# Patient Record
Sex: FEMALE | Race: ASIAN | NOT HISPANIC OR LATINO
[De-identification: names, ages, dates, MRNs, and addresses within clinical notes are randomized per-mention and may not be internally consistent; named-entity substitution may affect disease eponyms.]

---

## 2017-03-30 PROBLEM — Z00.00 ENCOUNTER FOR PREVENTIVE HEALTH EXAMINATION: Status: ACTIVE | Noted: 2017-03-30

## 2017-04-26 ENCOUNTER — APPOINTMENT (OUTPATIENT)
Dept: GASTROENTEROLOGY | Facility: CLINIC | Age: 62
End: 2017-04-26

## 2017-04-26 DIAGNOSIS — Z82.3 FAMILY HISTORY OF STROKE: ICD-10-CM

## 2017-04-26 DIAGNOSIS — E78.00 PURE HYPERCHOLESTEROLEMIA, UNSPECIFIED: ICD-10-CM

## 2017-04-26 DIAGNOSIS — Z83.3 FAMILY HISTORY OF DIABETES MELLITUS: ICD-10-CM

## 2017-04-26 DIAGNOSIS — Z78.9 OTHER SPECIFIED HEALTH STATUS: ICD-10-CM

## 2017-04-26 DIAGNOSIS — Z86.39 PERSONAL HISTORY OF OTHER ENDOCRINE, NUTRITIONAL AND METABOLIC DISEASE: ICD-10-CM

## 2017-04-26 DIAGNOSIS — F15.90 OTHER STIMULANT USE, UNSPECIFIED, UNCOMPLICATED: ICD-10-CM

## 2017-04-26 DIAGNOSIS — Z12.11 ENCOUNTER FOR SCREENING FOR MALIGNANT NEOPLASM OF COLON: ICD-10-CM

## 2017-04-26 RX ORDER — METOPROLOL TARTRATE 25 MG/1
25 TABLET, FILM COATED ORAL
Refills: 0 | Status: ACTIVE | COMMUNITY

## 2017-04-26 RX ORDER — LISINOPRIL 20 MG/1
20 TABLET ORAL
Refills: 0 | Status: ACTIVE | COMMUNITY

## 2017-04-26 RX ORDER — INSULIN GLARGINE 100 [IU]/ML
100 INJECTION, SOLUTION SUBCUTANEOUS
Refills: 0 | Status: ACTIVE | COMMUNITY

## 2017-04-26 RX ORDER — GLIPIZIDE 10 MG/1
10 TABLET ORAL
Refills: 0 | Status: ACTIVE | COMMUNITY

## 2017-04-26 RX ORDER — SIMVASTATIN 40 MG/1
40 TABLET, FILM COATED ORAL
Refills: 0 | Status: ACTIVE | COMMUNITY

## 2017-04-26 RX ORDER — ASPIRIN 81 MG
81 TABLET, DELAYED RELEASE (ENTERIC COATED) ORAL
Refills: 0 | Status: ACTIVE | COMMUNITY

## 2018-07-27 PROBLEM — Z78.9 ALCOHOL USE: Status: INACTIVE | Noted: 2017-04-26

## 2018-12-27 VITALS
DIASTOLIC BLOOD PRESSURE: 81 MMHG | HEART RATE: 72 BPM | TEMPERATURE: 98 F | HEIGHT: 64 IN | SYSTOLIC BLOOD PRESSURE: 184 MMHG | RESPIRATION RATE: 18 BRPM | WEIGHT: 175.05 LBS | OXYGEN SATURATION: 100 %

## 2018-12-27 RX ORDER — CHLORHEXIDINE GLUCONATE 213 G/1000ML
1 SOLUTION TOPICAL ONCE
Qty: 0 | Refills: 0 | Status: DISCONTINUED | OUTPATIENT
Start: 2018-12-31 | End: 2018-12-31

## 2018-12-27 NOTE — H&P ADULT - HISTORY OF PRESENT ILLNESS
SKELETON  *Confirm Medications on Arrival    64 y/o F with PMHx of…  who presented to their cardiologist c/o CP and SOB...  Pt denies … dizziness, diaphoresis, palpitations, fatigue, LE edema, orthopnea, PND, syncope  Pt had a Myocardial Perfusion Scan (12/15/18): Inferior ischemia noted with normal EF. Upon resting study, no evidence of perfusion defect with wall motion abnormality noted. EF 48%. Stress Study, noted inferior wall perfusion defect with wall motion abnormality EF 54%.   Due to pts risk factors, CCS Angina Class _ Sx, abnormal Myocardial Perfusion Scan , pt is referred for cardiac catheterization w/ possible intervention. *Confirm Medications on Arrival  62 y/o F with PMHx HTN, HLD, DM, who presented to her cardiologist for routine follow-up. She reports that she experiences SOB after climbing her 2 flights of stairs if she is carrying something, grocery bags for example, relieved with rest. She denies CP, dizziness, diaphoresis, n/v, palpitations, fatigue, LE edema, orthopnea, PND, or syncope. She had a Myocardial Perfusion Scan (12/15/18): Inferior ischemia noted with normal EF. Upon resting study, no evidence of perfusion defect with wall motion abnormality noted. EF 48%. Stress Study noted inferior wall perfusion defect with wall motion abnormality EF 54%.   In light of patient's risk factors, CCS Angina Class II angina equivalent symptoms, and abnormal NST, the patient is recommended for cardiac catheterization w/ possible intervention if clinically indicated. 62 y/o F with FHx of CAD and PMHx HTN, HLD, DM, who presented to her cardiologist for routine follow-up. She reports that she experiences SOB after climbing her 2 flights of stairs if she is carrying something, grocery bags for example, relieved with rest. She denies CP, dizziness, diaphoresis, n/v, palpitations, fatigue, LE edema, orthopnea, PND, or syncope. She had a Myocardial Perfusion Scan (12/15/18): Inferior ischemia noted with normal EF. Upon resting study, no evidence of perfusion defect with wall motion abnormality noted. EF 48%. Stress Study noted inferior wall perfusion defect with wall motion abnormality EF 54%.   In light of patient's risk factors, CCS Angina Class II angina equivalent symptoms, and abnormal NST, the patient is recommended for cardiac catheterization w/ possible intervention if clinically indicated.

## 2018-12-27 NOTE — H&P ADULT - SCARS
location/well healed, hyperpigmented 1 inch scar on right groin 2/2 minor trauma (accidental scratch)

## 2018-12-27 NOTE — H&P ADULT - RS GEN PE MLT RESP DETAILS PC
good air movement/no rhonchi/no wheezes/clear to auscultation bilaterally/no rales/respirations non-labored

## 2018-12-27 NOTE — H&P ADULT - NSHPLABSRESULTS_GEN_ALL_CORE
13.7   7.0   )-----------( 270      ( 31 Dec 2018 08:19 )             41.0   12-31    141  |  102  |  12  ----------------------------<  182<H>  4.7   |  26  |  0.59    Ca    9.6      31 Dec 2018 08:19    TPro  7.9  /  Alb  4.9  /  TBili  0.8  /  DBili  x   /  AST  20  /  ALT  23  /  AlkPhos  40  12-31  PT/INR - ( 31 Dec 2018 08:19 )   PT: 11.3 sec;   INR: 1.00          PTT - ( 31 Dec 2018 08:19 )  PTT:35.1 sec

## 2018-12-27 NOTE — H&P ADULT - ASSESSMENT
64 y/o F with FHx of CAD and PMHx HTN, HLD, DM, who presents to [facility] for cardiac catheterization w/ possible intervention if clinically indicated in the setting of patient's risk factors, CCS Angina Class II angina equivalent symptoms, and abnormal NST.  ASA III   Mallampati IV    Risks & benefits of procedure and alternative therapy have been explained to the patient including but not limited to: allergic reaction, bleeding w/possible need for blood transfusion, infection, renal and vascular compromise, limb damage, arrhythmia, stroke, vessel dissection/perforation, Myocardial infarction, emergent CABG. Informed consent obtained and in chart.       Precath/  Consented  - Euvolemic on exam in the setting of LVEF of 48-54%. IVF: NS @ 75 cc/hr  As d/w Dr. Fallon  - Pt admitted with BP of 184/81 (HR 72) with endorsement of not taking her BP medications this AM. Pt is resting comfortably. Will monitor BP and HR and may give BP medications (Metoprolol Tartate 75mg BID and Lisinopril 20mg QD) post-procedure.  - Pt loaded with ASA 325mg PO x1 STAT and Plavix 300mg PO x1 STAT pre-procedure.

## 2018-12-31 ENCOUNTER — OUTPATIENT (OUTPATIENT)
Dept: OUTPATIENT SERVICES | Facility: HOSPITAL | Age: 63
LOS: 1 days | Discharge: HOME | End: 2018-12-31
Payer: MEDICAID

## 2018-12-31 LAB
ALBUMIN SERPL ELPH-MCNC: 4.9 G/DL — SIGNIFICANT CHANGE UP (ref 3.3–5)
ALP SERPL-CCNC: 40 U/L — SIGNIFICANT CHANGE UP (ref 40–120)
ALT FLD-CCNC: 23 U/L — SIGNIFICANT CHANGE UP (ref 10–45)
ANION GAP SERPL CALC-SCNC: 13 MMOL/L — SIGNIFICANT CHANGE UP (ref 5–17)
APTT BLD: 35.1 SEC — SIGNIFICANT CHANGE UP (ref 27.5–36.3)
AST SERPL-CCNC: 20 U/L — SIGNIFICANT CHANGE UP (ref 10–40)
BASOPHILS NFR BLD AUTO: 0.1 % — SIGNIFICANT CHANGE UP (ref 0–2)
BILIRUB SERPL-MCNC: 0.8 MG/DL — SIGNIFICANT CHANGE UP (ref 0.2–1.2)
BUN SERPL-MCNC: 12 MG/DL — SIGNIFICANT CHANGE UP (ref 7–23)
CALCIUM SERPL-MCNC: 9.6 MG/DL — SIGNIFICANT CHANGE UP (ref 8.4–10.5)
CHLORIDE SERPL-SCNC: 102 MMOL/L — SIGNIFICANT CHANGE UP (ref 96–108)
CHOLEST SERPL-MCNC: 149 MG/DL — SIGNIFICANT CHANGE UP (ref 10–199)
CK MB CFR SERPL CALC: 1.6 NG/ML — SIGNIFICANT CHANGE UP (ref 0–6.7)
CO2 SERPL-SCNC: 26 MMOL/L — SIGNIFICANT CHANGE UP (ref 22–31)
CREAT SERPL-MCNC: 0.59 MG/DL — SIGNIFICANT CHANGE UP (ref 0.5–1.3)
CRP SERPL-MCNC: 0.14 MG/DL — SIGNIFICANT CHANGE UP (ref 0–0.4)
EOSINOPHIL NFR BLD AUTO: 1.6 % — SIGNIFICANT CHANGE UP (ref 0–6)
GLUCOSE BLDC GLUCOMTR-MCNC: 156 MG/DL — HIGH (ref 70–99)
GLUCOSE BLDC GLUCOMTR-MCNC: 166 MG/DL — HIGH (ref 70–99)
GLUCOSE SERPL-MCNC: 182 MG/DL — HIGH (ref 70–99)
HBA1C BLD-MCNC: 7.1 % — HIGH (ref 4–5.6)
HCT VFR BLD CALC: 41 % — SIGNIFICANT CHANGE UP (ref 34.5–45)
HDLC SERPL-MCNC: 52 MG/DL — SIGNIFICANT CHANGE UP
HGB BLD-MCNC: 13.7 G/DL — SIGNIFICANT CHANGE UP (ref 11.5–15.5)
INR BLD: 1 — SIGNIFICANT CHANGE UP (ref 0.88–1.16)
LIPID PNL WITH DIRECT LDL SERPL: 80 MG/DL — SIGNIFICANT CHANGE UP
LYMPHOCYTES # BLD AUTO: 49.6 % — HIGH (ref 13–44)
MCHC RBC-ENTMCNC: 27.3 PG — SIGNIFICANT CHANGE UP (ref 27–34)
MCHC RBC-ENTMCNC: 33.4 G/DL — SIGNIFICANT CHANGE UP (ref 32–36)
MCV RBC AUTO: 81.7 FL — SIGNIFICANT CHANGE UP (ref 80–100)
MONOCYTES NFR BLD AUTO: 4.3 % — SIGNIFICANT CHANGE UP (ref 2–14)
NEUTROPHILS NFR BLD AUTO: 44.4 % — SIGNIFICANT CHANGE UP (ref 43–77)
PLATELET # BLD AUTO: 270 K/UL — SIGNIFICANT CHANGE UP (ref 150–400)
POTASSIUM SERPL-MCNC: 4.7 MMOL/L — SIGNIFICANT CHANGE UP (ref 3.5–5.3)
POTASSIUM SERPL-SCNC: 4.7 MMOL/L — SIGNIFICANT CHANGE UP (ref 3.5–5.3)
PROT SERPL-MCNC: 7.9 G/DL — SIGNIFICANT CHANGE UP (ref 6–8.3)
PROTHROM AB SERPL-ACNC: 11.3 SEC — SIGNIFICANT CHANGE UP (ref 10–12.9)
RBC # BLD: 5.02 M/UL — SIGNIFICANT CHANGE UP (ref 3.8–5.2)
RBC # FLD: 13.7 % — SIGNIFICANT CHANGE UP (ref 10.3–16.9)
SODIUM SERPL-SCNC: 141 MMOL/L — SIGNIFICANT CHANGE UP (ref 135–145)
TOTAL CHOLESTEROL/HDL RATIO MEASUREMENT: 2.9 RATIO — LOW (ref 3.3–7.1)
TRIGL SERPL-MCNC: 86 MG/DL — SIGNIFICANT CHANGE UP (ref 10–149)
WBC # BLD: 7 K/UL — SIGNIFICANT CHANGE UP (ref 3.8–10.5)
WBC # FLD AUTO: 7 K/UL — SIGNIFICANT CHANGE UP (ref 3.8–10.5)

## 2018-12-31 PROCEDURE — 85610 PROTHROMBIN TIME: CPT

## 2018-12-31 PROCEDURE — 82550 ASSAY OF CK (CPK): CPT

## 2018-12-31 PROCEDURE — 93458 L HRT ARTERY/VENTRICLE ANGIO: CPT | Mod: 26

## 2018-12-31 PROCEDURE — 80061 LIPID PANEL: CPT

## 2018-12-31 PROCEDURE — 85730 THROMBOPLASTIN TIME PARTIAL: CPT

## 2018-12-31 PROCEDURE — C1769: CPT

## 2018-12-31 PROCEDURE — 93010 ELECTROCARDIOGRAM REPORT: CPT

## 2018-12-31 PROCEDURE — 93458 L HRT ARTERY/VENTRICLE ANGIO: CPT

## 2018-12-31 PROCEDURE — 85025 COMPLETE CBC W/AUTO DIFF WBC: CPT

## 2018-12-31 PROCEDURE — 80053 COMPREHEN METABOLIC PANEL: CPT

## 2018-12-31 PROCEDURE — C1894: CPT

## 2018-12-31 PROCEDURE — 93005 ELECTROCARDIOGRAM TRACING: CPT

## 2018-12-31 PROCEDURE — 83036 HEMOGLOBIN GLYCOSYLATED A1C: CPT

## 2018-12-31 PROCEDURE — 86140 C-REACTIVE PROTEIN: CPT

## 2018-12-31 PROCEDURE — 82553 CREATINE MB FRACTION: CPT

## 2018-12-31 PROCEDURE — C1887: CPT

## 2018-12-31 PROCEDURE — 82962 GLUCOSE BLOOD TEST: CPT

## 2018-12-31 RX ORDER — LISINOPRIL/HYDROCHLOROTHIAZIDE 10-12.5 MG
1 TABLET ORAL
Qty: 30 | Refills: 3
Start: 2018-12-31 | End: 2019-04-29

## 2018-12-31 RX ORDER — METOPROLOL TARTRATE 50 MG
0 TABLET ORAL
Qty: 0 | Refills: 0 | COMMUNITY

## 2018-12-31 RX ORDER — METOPROLOL TARTRATE 50 MG
75 TABLET ORAL ONCE
Qty: 0 | Refills: 0 | Status: COMPLETED | OUTPATIENT
Start: 2018-12-31 | End: 2018-12-31

## 2018-12-31 RX ORDER — ACETAMINOPHEN 500 MG
650 TABLET ORAL ONCE
Qty: 0 | Refills: 0 | Status: COMPLETED | OUTPATIENT
Start: 2018-12-31 | End: 2018-12-31

## 2018-12-31 RX ORDER — ASPIRIN/CALCIUM CARB/MAGNESIUM 324 MG
325 TABLET ORAL ONCE
Qty: 0 | Refills: 0 | Status: COMPLETED | OUTPATIENT
Start: 2018-12-31 | End: 2018-12-31

## 2018-12-31 RX ORDER — SIMVASTATIN 20 MG/1
0 TABLET, FILM COATED ORAL
Qty: 0 | Refills: 0 | COMMUNITY

## 2018-12-31 RX ORDER — CLOPIDOGREL BISULFATE 75 MG/1
300 TABLET, FILM COATED ORAL ONCE
Qty: 0 | Refills: 0 | Status: COMPLETED | OUTPATIENT
Start: 2018-12-31 | End: 2018-12-31

## 2018-12-31 RX ORDER — METFORMIN HYDROCHLORIDE 850 MG/1
0 TABLET ORAL
Qty: 0 | Refills: 0 | COMMUNITY

## 2018-12-31 RX ORDER — ENOXAPARIN SODIUM 100 MG/ML
56 INJECTION SUBCUTANEOUS
Qty: 0 | Refills: 0 | COMMUNITY

## 2018-12-31 RX ORDER — INSULIN LISPRO 100/ML
VIAL (ML) SUBCUTANEOUS ONCE
Qty: 0 | Refills: 0 | Status: DISCONTINUED | OUTPATIENT
Start: 2018-12-31 | End: 2018-12-31

## 2018-12-31 RX ORDER — SODIUM CHLORIDE 9 MG/ML
500 INJECTION INTRAMUSCULAR; INTRAVENOUS; SUBCUTANEOUS
Qty: 0 | Refills: 0 | Status: DISCONTINUED | OUTPATIENT
Start: 2018-12-31 | End: 2018-12-31

## 2018-12-31 RX ORDER — LORATADINE 10 MG/1
0 TABLET ORAL
Qty: 0 | Refills: 0 | COMMUNITY

## 2018-12-31 RX ADMIN — Medication 50 MILLIGRAM(S): at 12:42

## 2018-12-31 RX ADMIN — Medication 650 MILLIGRAM(S): at 10:23

## 2018-12-31 RX ADMIN — SODIUM CHLORIDE 75 MILLILITER(S): 9 INJECTION INTRAMUSCULAR; INTRAVENOUS; SUBCUTANEOUS at 08:40

## 2018-12-31 RX ADMIN — SODIUM CHLORIDE 75 MILLILITER(S): 9 INJECTION INTRAMUSCULAR; INTRAVENOUS; SUBCUTANEOUS at 10:24

## 2018-12-31 RX ADMIN — CLOPIDOGREL BISULFATE 300 MILLIGRAM(S): 75 TABLET, FILM COATED ORAL at 08:38

## 2018-12-31 RX ADMIN — Medication 325 MILLIGRAM(S): at 08:38

## 2018-12-31 NOTE — PROGRESS NOTE ADULT - SUBJECTIVE AND OBJECTIVE BOX
Interventional Cardiology PA SDA Discharge Note    Patient without complaints. Ambulated and voided without difficulties    Afebrile, VSS    Ext: Right Radial: No hematoma, no bleeding, dressing; C/D/I      Pulses: 2+ intact RAD to baseline     A/P: 62 y/o F with FHx of CAD and PMHx HTN, HLD, DM, who presents to [facility] for cardiac catheterization w/ possible intervention if clinically indicated in the setting of patient's risk factors, CCS Angina Class II angina equivalent symptoms, and abnormal NST. Pt is now s/p dx cardiac cath on 12/31/18: LMCA- luminal irregularities, LAD- luminal, LCX- distal 60% stenosis (tapers to smaller vessel), RCA- dominant, luminal irregularities. LVEF 65% with mid inferior hypokinesis, LVEDP normal, no AS. Per Dr. Fallon, plan to optimize medical therapy, prescription for Lisinopril/HCTZ 20mg/25mg sent to pt's pharmacy, and pt instructed to hold Metformin for 48 hours with plan to restart on 1/2/18.    1.	Stable for discharge as per attending Dr. Fallon after bed rest, pt voids, wrist stable and 30 minutes of ambulation.  2.	Follow-up with PMD/Cardiologist Dr. Feliz in 1-2 weeks.  3.	Discharged forms signed and copies in chart.

## 2018-12-31 NOTE — PROGRESS NOTE ADULT - SUBJECTIVE AND OBJECTIVE BOX
Interventional Cardiology Radial band Removal Note    Pt without complaints.  VSS.    Right Radial access site D-Stat Hemoband in place, no hematoma, no bleed  Radial pulse: 2+ intact to baseline    Hemostasis achieved with manual release of hemoband.    no Vasovagal reaction.    Meds given: Metoprolol Tartrate 75mg prior to removal    Right/Left Radial access site no hematoma, no bleed  Radial pulse: 2+ intact to baseline    A&P:  s/p Dx Coronary Angiogram  -	continue to monitor  -	OOB as tolerated  -	Post Procedure Instructions given  -          Call [phone] if any access site issues.

## 2019-05-03 NOTE — H&P ADULT - CARDIOVASCULAR
Patient aware and voiced understanding, no concerns voiced at this time. Pt would like order mailed to her. detailed exam

## 2023-10-04 PROBLEM — E78.5 HYPERLIPIDEMIA, UNSPECIFIED: Chronic | Status: ACTIVE | Noted: 2018-12-27

## 2023-10-04 PROBLEM — I10 ESSENTIAL (PRIMARY) HYPERTENSION: Chronic | Status: ACTIVE | Noted: 2018-12-27

## 2023-10-04 PROBLEM — E11.9 TYPE 2 DIABETES MELLITUS WITHOUT COMPLICATIONS: Chronic | Status: ACTIVE | Noted: 2018-12-27

## 2023-10-05 ENCOUNTER — OUTPATIENT (OUTPATIENT)
Dept: OUTPATIENT SERVICES | Facility: HOSPITAL | Age: 68
LOS: 1 days | Discharge: HOME | End: 2023-10-05
Payer: MEDICARE

## 2023-10-05 DIAGNOSIS — R07.89 OTHER CHEST PAIN: ICD-10-CM

## 2023-10-05 LAB
ANION GAP SERPL CALC-SCNC: 16 MMOL/L — HIGH (ref 7–14)
BUN SERPL-MCNC: 11 MG/DL — SIGNIFICANT CHANGE UP (ref 7–23)
CALCIUM SERPL-MCNC: 9.9 MG/DL — SIGNIFICANT CHANGE UP (ref 8.4–10.5)
CHLORIDE SERPL-SCNC: 100 MMOL/L — SIGNIFICANT CHANGE UP (ref 98–107)
CO2 SERPL-SCNC: 21 MMOL/L — LOW (ref 22–31)
CREAT SERPL-MCNC: 0.59 MG/DL — SIGNIFICANT CHANGE UP (ref 0.5–1.3)
EGFR: 98 ML/MIN/1.73M2 — SIGNIFICANT CHANGE UP
GLUCOSE BLDC GLUCOMTR-MCNC: 113 MG/DL — HIGH (ref 70–99)
GLUCOSE SERPL-MCNC: 113 MG/DL — HIGH (ref 70–99)
HCT VFR BLD CALC: 39.8 % — SIGNIFICANT CHANGE UP (ref 34.5–45)
HGB BLD-MCNC: 13.3 G/DL — SIGNIFICANT CHANGE UP (ref 11.5–15.5)
MCHC RBC-ENTMCNC: 25.4 PG — LOW (ref 27–34)
MCHC RBC-ENTMCNC: 33.4 GM/DL — SIGNIFICANT CHANGE UP (ref 32–36)
MCV RBC AUTO: 76.1 FL — LOW (ref 80–100)
NRBC # BLD: 0 /100 WBCS — SIGNIFICANT CHANGE UP (ref 0–0)
NRBC # FLD: 0 K/UL — SIGNIFICANT CHANGE UP (ref 0–0)
PLATELET # BLD AUTO: 342 K/UL — SIGNIFICANT CHANGE UP (ref 150–400)
POTASSIUM SERPL-MCNC: 4.5 MMOL/L — SIGNIFICANT CHANGE UP (ref 3.5–5.3)
POTASSIUM SERPL-SCNC: 4.5 MMOL/L — SIGNIFICANT CHANGE UP (ref 3.5–5.3)
RBC # BLD: 5.23 M/UL — HIGH (ref 3.8–5.2)
RBC # FLD: 17.1 % — HIGH (ref 10.3–14.5)
SODIUM SERPL-SCNC: 137 MMOL/L — SIGNIFICANT CHANGE UP (ref 135–145)
WBC # BLD: 8.74 K/UL — SIGNIFICANT CHANGE UP (ref 3.8–10.5)
WBC # FLD AUTO: 8.74 K/UL — SIGNIFICANT CHANGE UP (ref 3.8–10.5)

## 2023-10-05 PROCEDURE — 93010 ELECTROCARDIOGRAM REPORT: CPT

## 2023-10-05 PROCEDURE — 93454 CORONARY ARTERY ANGIO S&I: CPT | Mod: 26

## 2023-10-05 PROCEDURE — 99152 MOD SED SAME PHYS/QHP 5/>YRS: CPT

## 2023-10-05 RX ORDER — SODIUM CHLORIDE 9 MG/ML
3 INJECTION INTRAMUSCULAR; INTRAVENOUS; SUBCUTANEOUS EVERY 8 HOURS
Refills: 0 | Status: DISCONTINUED | OUTPATIENT
Start: 2023-10-05 | End: 2023-10-19

## 2023-10-05 RX ORDER — SIMVASTATIN 20 MG/1
1 TABLET, FILM COATED ORAL
Qty: 0 | Refills: 0 | DISCHARGE

## 2023-10-05 RX ORDER — METFORMIN HYDROCHLORIDE 850 MG/1
1 TABLET ORAL
Qty: 0 | Refills: 0 | DISCHARGE

## 2023-10-05 RX ORDER — RIVAROXABAN 15 MG-20MG
1 KIT ORAL
Qty: 30 | Refills: 0
Start: 2023-10-05 | End: 2023-11-03

## 2023-10-05 RX ORDER — SODIUM CHLORIDE 9 MG/ML
250 INJECTION INTRAMUSCULAR; INTRAVENOUS; SUBCUTANEOUS ONCE
Refills: 0 | Status: COMPLETED | OUTPATIENT
Start: 2023-10-05 | End: 2023-10-05

## 2023-10-05 RX ORDER — ASPIRIN/CALCIUM CARB/MAGNESIUM 324 MG
1 TABLET ORAL
Refills: 0 | DISCHARGE

## 2023-10-05 RX ORDER — DICLOFENAC SODIUM 75 MG/1
1 TABLET, DELAYED RELEASE ORAL
Qty: 0 | Refills: 0 | DISCHARGE

## 2023-10-05 RX ORDER — METOPROLOL TARTRATE 50 MG
1.5 TABLET ORAL
Qty: 0 | Refills: 0 | DISCHARGE

## 2023-10-05 RX ORDER — SODIUM CHLORIDE 9 MG/ML
500 INJECTION INTRAMUSCULAR; INTRAVENOUS; SUBCUTANEOUS
Refills: 0 | Status: DISCONTINUED | OUTPATIENT
Start: 2023-10-05 | End: 2023-10-19

## 2023-10-05 RX ORDER — GLIMEPIRIDE 1 MG
1 TABLET ORAL
Refills: 0 | DISCHARGE

## 2023-10-05 RX ORDER — LORATADINE 10 MG/1
1 TABLET ORAL
Qty: 0 | Refills: 0 | DISCHARGE

## 2023-10-05 RX ORDER — RIVAROXABAN 15 MG-20MG
1 KIT ORAL
Refills: 0 | DISCHARGE

## 2023-10-05 RX ORDER — INSULIN GLARGINE 100 [IU]/ML
43 INJECTION, SOLUTION SUBCUTANEOUS
Refills: 0 | DISCHARGE

## 2023-10-05 RX ORDER — LISINOPRIL 2.5 MG/1
1 TABLET ORAL
Qty: 0 | Refills: 0 | DISCHARGE

## 2023-10-05 RX ORDER — HYDROCHLOROTHIAZIDE 25 MG
1 TABLET ORAL
Refills: 0 | DISCHARGE

## 2023-10-05 RX ADMIN — SODIUM CHLORIDE 1000 MILLILITER(S): 9 INJECTION INTRAMUSCULAR; INTRAVENOUS; SUBCUTANEOUS at 09:39

## 2023-10-05 RX ADMIN — SODIUM CHLORIDE 75 MILLILITER(S): 9 INJECTION INTRAMUSCULAR; INTRAVENOUS; SUBCUTANEOUS at 09:39

## 2023-10-05 NOTE — H&P CARDIOLOGY - NSICDXPASTMEDICALHX_GEN_ALL_CORE_FT
PAST MEDICAL HISTORY:  DM (diabetes mellitus)     HLD (hyperlipidemia)     HTN (hypertension)      PAST MEDICAL HISTORY:  Atrial fibrillation     Dementia     DM (diabetes mellitus)     H/O carotid stenosis     HLD (hyperlipidemia)     HTN (hypertension)

## 2023-10-05 NOTE — H&P CARDIOLOGY - HISTORY OF PRESENT ILLNESS
65 year old male with HTN, hyperlipidemia, DM type 2 who presented to his cardiologist complaining of  In light of patients cardiac risk factors, symptoms and abnormal noninvasive test findings there is high suspicion for CAD. Patient is now referred to [facility] for a cardiac catheterization with possible PTCA/stent.     Referring MD:    65 year old female with dementia (A&O x2 with short term memory loss) with HTN, hyperlipidemia, DM type 2, carotid artery stenosis,  Atrial fibrillation who presented to his cardiologist for routine follow up. Patient was advised she had an abnormal EKG, started on Xarelto and subsequently underwent a Nuclear stress test with reported anteriolateral ischemia.  Denies chest pain, SOB, dizziness, syncope, edema, orthopnea and PND.   In light of patients cardiac risk factors and abnormal noninvasive test findings there is high suspicion for CAD. Patient is now referred to [facility] for a cardiac catheterization with possible PTCA/stent.

## 2023-10-05 NOTE — H&P CARDIOLOGY - SOURCE
- reliable and medical records/Patient unreliable and forgetful,  over telephone - reliable and medical records/Patient